# Patient Record
Sex: MALE | Race: WHITE | NOT HISPANIC OR LATINO | Employment: FULL TIME | URBAN - METROPOLITAN AREA
[De-identification: names, ages, dates, MRNs, and addresses within clinical notes are randomized per-mention and may not be internally consistent; named-entity substitution may affect disease eponyms.]

---

## 2019-03-25 ENCOUNTER — HOSPITAL ENCOUNTER (OUTPATIENT)
Dept: GENERAL RADIOLOGY | Facility: HOSPITAL | Age: 34
Discharge: HOME | End: 2019-03-25
Admitting: NURSE PRACTITIONER

## 2019-03-25 ENCOUNTER — OFFICE VISIT (OUTPATIENT)
Dept: RETAIL CLINIC | Facility: CLINIC | Age: 34
End: 2019-03-25

## 2019-03-25 VITALS
HEART RATE: 112 BPM | SYSTOLIC BLOOD PRESSURE: 128 MMHG | TEMPERATURE: 98.2 F | OXYGEN SATURATION: 98 % | RESPIRATION RATE: 18 BRPM | DIASTOLIC BLOOD PRESSURE: 74 MMHG

## 2019-03-25 DIAGNOSIS — M25.562 ACUTE PAIN OF LEFT KNEE: ICD-10-CM

## 2019-03-25 DIAGNOSIS — M25.562 ACUTE PAIN OF LEFT KNEE: Primary | ICD-10-CM

## 2019-03-25 PROCEDURE — 73560 X-RAY EXAM OF KNEE 1 OR 2: CPT

## 2019-03-25 PROCEDURE — 99213 OFFICE O/P EST LOW 20 MIN: CPT | Performed by: NURSE PRACTITIONER

## 2019-03-25 RX ORDER — NAPROXEN 500 MG/1
500 TABLET ORAL 2 TIMES DAILY WITH MEALS
Qty: 20 TABLET | Refills: 0 | Status: SHIPPED | OUTPATIENT
Start: 2019-03-25 | End: 2019-04-04

## 2019-03-25 NOTE — PROGRESS NOTES
"Anatoliy Bejarano is a 33 y.o. male.     Knee Pain    The incident occurred 3 to 6 hours ago. [place] the park (fell [place] skatboard, hit medial left knee concrete). The injury mechanism was a direct blow and a twisting injury. The pain is present in the left knee. The pain is at a severity of 3/10 (constant 3, up to a 10 with movement). The pain is moderate. The pain has been constant since onset. Pertinent negatives include no loss of sensation or numbness. Associated symptoms comments: Knee feels \"tight\". He has tried ice and NSAIDs for the symptoms. The treatment provided mild relief.        The following portions of the patient's history were reviewed and updated as appropriate: allergies, current medications, past family history, past medical history, past social history, past surgical history and problem list.    Review of Systems   HENT: Negative.    Respiratory: Negative.    Cardiovascular: Negative.    Gastrointestinal: Negative.    Musculoskeletal: Positive for gait problem.   Neurological: Negative for numbness.       Objective   Physical Exam   Constitutional: He is cooperative. No distress.   HENT:   Head: Normocephalic.   Right Ear: Hearing, external ear and ear canal normal. Tympanic membrane is not erythematous.   Left Ear: Hearing, tympanic membrane, external ear and ear canal normal.   Nose: Nose normal.   Mouth/Throat: Oropharynx is clear and moist.   Eyes: Conjunctivae, EOM and lids are normal. Pupils are equal, round, and reactive to light.   Neck: Trachea normal and full passive range of motion without pain.   Cardiovascular: Normal rate, regular rhythm and normal pulses.   Pulmonary/Chest: Effort normal and breath sounds normal.   Musculoskeletal:   Left knee with medial swelling, redness and edema   Neurological: He is alert.   Skin: Skin is warm. Capillary refill takes less than 2 seconds.   Psychiatric: He has a normal mood and affect. His speech is normal and " behavior is normal.   Vitals reviewed.        Assessment/Plan   Jose was seen today for knee pain.    Diagnoses and all orders for this visit:    Acute pain of left knee  -     XR Knee 1 or 2 View Left; Future    Other orders  -     naproxen (NAPROSYN) 500 MG tablet; Take 1 tablet by mouth 2 (Two) Times a Day With Meals for 10 days.

## 2019-03-25 NOTE — PATIENT INSTRUCTIONS
Knee Pain, Adult  Knee pain in adults is common. It can be caused by many things, including:  · Arthritis.  · A fluid-filled sac (cyst) or growth in your knee.  · An infection in your knee.  · An injury that will not heal.  · Damage, swelling, or irritation of the tissues that support your knee.    Knee pain is usually not a sign of a serious problem. The pain may go away on its own with time and rest. If it does not, a health care provider may order tests to find the cause of the pain. These may include:  · Imaging tests, such as an X-ray, MRI, or ultrasound.  · Joint aspiration. In this test, fluid is removed from the knee.  · Arthroscopy. In this test, a lighted tube is inserted into knee and an image is projected onto a TV screen.  · A biopsy. In this test, a sample of tissue is removed from the body and studied under a microscope.    Follow these instructions at home:  Pay attention to any changes in your symptoms. Take these actions to relieve your pain.  Activity  · Rest your knee.  · Do not do things that cause pain or make pain worse.  · Avoid high-impact activities or exercises, such as running, jumping rope, or doing jumping jacks.  General instructions  · Take over-the-counter and prescription medicines only as told by your health care provider.  · Raise (elevate) your knee above the level of your heart when you are sitting or lying down.  · Sleep with a pillow under your knee.  · If directed, apply ice to the knee:  ? Put ice in a plastic bag.  ? Place a towel between your skin and the bag.  ? Leave the ice on for 20 minutes, 2-3 times a day.  · Ask your health care provider if you should wear an elastic knee support.  · Lose weight if you are overweight. Extra weight can put pressure on your knee.  · Do not use any products that contain nicotine or tobacco, such as cigarettes and e-cigarettes. Smoking may slow the healing of any bone and joint problems that you may have. If you need help quitting, ask  your health care provider.  Contact a health care provider if:  · Your knee pain continues, changes, or gets worse.  · You have a fever along with knee pain.  · Your knee gallo or locks up.  · Your knee swells, and the swelling becomes worse.  Get help right away if:  · Your knee feels warm to the touch.  · You cannot move your knee.  · You have severe pain in your knee.  · You have chest pain.  · You have trouble breathing.  Summary  · Knee pain in adults is common. It can be caused by many things, including, arthritis, infection, cysts, or injury.  · Knee pain is usually not a sign of a serious problem, but if it does not go away, a health care provider may perform tests to know the cause of the pain.  · Pay attention to any changes in your symptoms. Relieve your pain with rest, medicines, light activity, and use of ice.  · Get help if your pain continues or becomes very severe, or if your knee gallo or locks up, or if you have chest pain or trouble breathing.  This information is not intended to replace advice given to you by your health care provider. Make sure you discuss any questions you have with your health care provider.  Document Released: 10/14/2008 Document Revised: 12/08/2017 Document Reviewed: 12/08/2017  [employer] Interactive Patient Education © 2019 [employer]

## 2019-03-26 ENCOUNTER — TELEPHONE (OUTPATIENT)
Dept: RETAIL CLINIC | Facility: CLINIC | Age: 34
End: 2019-03-26

## 2019-03-26 NOTE — TELEPHONE ENCOUNTER
Knee xray showed no evidence of fracture or dislocation, if continues to have pain and swelling follow up with PCP or orthopedics.